# Patient Record
Sex: FEMALE | NOT HISPANIC OR LATINO | Employment: UNEMPLOYED | URBAN - METROPOLITAN AREA
[De-identification: names, ages, dates, MRNs, and addresses within clinical notes are randomized per-mention and may not be internally consistent; named-entity substitution may affect disease eponyms.]

---

## 2018-01-03 ENCOUNTER — OFFICE VISIT - RIVER FALLS (OUTPATIENT)
Dept: FAMILY MEDICINE | Facility: CLINIC | Age: 23
End: 2018-01-03

## 2018-01-03 ASSESSMENT — MIFFLIN-ST. JEOR: SCORE: 1040.73

## 2018-01-04 LAB
CREAT SERPL-MCNC: 0.7 MG/DL (ref 0.5–1.1)
GLUCOSE BLD-MCNC: 84 MG/DL (ref 65–99)

## 2021-06-09 ENCOUNTER — OFFICE VISIT - RIVER FALLS (OUTPATIENT)
Dept: FAMILY MEDICINE | Facility: CLINIC | Age: 26
End: 2021-06-09

## 2021-06-09 ASSESSMENT — MIFFLIN-ST. JEOR: SCORE: 1013.51

## 2021-06-15 ENCOUNTER — OFFICE VISIT - HEALTHEAST (OUTPATIENT)
Dept: BEHAVIORAL HEALTH | Facility: TELEHEALTH | Age: 26
End: 2021-06-15

## 2021-06-15 DIAGNOSIS — F43.10 PTSD (POST-TRAUMATIC STRESS DISORDER): ICD-10-CM

## 2021-06-16 ENCOUNTER — COMMUNICATION - RIVER FALLS (OUTPATIENT)
Dept: FAMILY MEDICINE | Facility: CLINIC | Age: 26
End: 2021-06-16

## 2021-06-16 LAB
1,25(OH)2D SERPL-MCNC: 76 PG/ML (ref 18–72)
A/G RATIO - HISTORICAL: 1.9 (ref 1–2.5)
ALBUMIN SERPL-MCNC: 4.9 GM/DL (ref 3.6–5.1)
ALP SERPL-CCNC: 51 UNIT/L (ref 31–125)
ALT SERPL W P-5'-P-CCNC: 8 UNIT/L (ref 6–29)
AST SERPL W P-5'-P-CCNC: 14 UNIT/L (ref 10–30)
BILIRUB DIRECT SERPL-MCNC: 0.2 MG/DL
BILIRUB INDIRECT SERPL-MCNC: 1.2 MG/DL (ref 0.2–1.2)
BILIRUB SERPL-MCNC: 1.4 MG/DL (ref 0.2–1.2)
BUN SERPL-MCNC: 15 MG/DL (ref 7–25)
BUN/CREAT RATIO - HISTORICAL: NORMAL (ref 6–22)
CALCIUM SERPL-MCNC: 9.8 MG/DL (ref 8.6–10.2)
CHLORIDE BLD-SCNC: 105 MMOL/L (ref 98–110)
CO2 SERPL-SCNC: 25 MMOL/L (ref 20–32)
CREAT SERPL-MCNC: 0.73 MG/DL (ref 0.5–1.1)
EGFRCR SERPLBLD CKD-EPI 2021: 115 ML/MIN/1.73M2
ERYTHROCYTE [DISTWIDTH] IN BLOOD BY AUTOMATED COUNT: 11.7 % (ref 11–15)
GLOBULIN: 2.6 (ref 1.9–3.7)
GLUCOSE BLD-MCNC: 88 MG/DL (ref 65–99)
HCT VFR BLD AUTO: 40.2 % (ref 35–45)
HGB BLD-MCNC: 13.4 GM/DL (ref 11.7–15.5)
LIPASE SERPL-CCNC: 17 UNIT/L (ref 7–60)
MCH RBC QN AUTO: 32.4 PG (ref 27–33)
MCHC RBC AUTO-ENTMCNC: 33.3 GM/DL (ref 32–36)
MCV RBC AUTO: 97.1 FL (ref 80–100)
PLATELET # BLD AUTO: 236 10*3/UL (ref 140–400)
PMV BLD: 10.8 FL (ref 7.5–12.5)
POTASSIUM BLD-SCNC: 3.9 MMOL/L (ref 3.5–5.3)
PROT SERPL-MCNC: 7.5 GM/DL (ref 6.1–8.1)
RBC # BLD AUTO: 4.14 10*6/UL (ref 3.8–5.1)
SODIUM SERPL-SCNC: 137 MMOL/L (ref 135–146)
TSH SERPL DL<=0.005 MIU/L-ACNC: 1.14 MIU/L
VIT B12 SERPL-MCNC: 658 PG/ML (ref 200–1100)
VITAMIN D2, 1,25 (OH)2 - QUEST: <8 PG/ML
VITAMIN D3, 1,25 (OH)2 - QUEST: 76 PG/ML
WBC # BLD AUTO: 5.1 10*3/UL (ref 3.8–10.8)
ZINC SERPL-MCNC: 85 MCG/DL (ref 60–130)

## 2021-06-17 ENCOUNTER — COMMUNICATION - RIVER FALLS (OUTPATIENT)
Dept: FAMILY MEDICINE | Facility: CLINIC | Age: 26
End: 2021-06-17

## 2021-06-17 ENCOUNTER — OFFICE VISIT - RIVER FALLS (OUTPATIENT)
Dept: FAMILY MEDICINE | Facility: CLINIC | Age: 26
End: 2021-06-17

## 2021-06-17 ASSESSMENT — MIFFLIN-ST. JEOR: SCORE: 1031.65

## 2021-06-26 NOTE — PROGRESS NOTES
[facility]: : Integrated Behavioral Health  06/15/2021      Behavioral Health Clinician Progress Note    Patient Name: Natalie Simmons           Service Type:  Individual      Service Location:   Phone call (patient / identified key support person reached)     Session Start Time: 2:00pm  Session End Time: 2:42pm      Session Length: 38 - 52     Attendees: Patient    Visit Activities (Refresh list every visit): NEW and [facility] Only    Diagnostic Assessment Date: Next Session  Treatment Plan Review Date: NA  See Flowsheets for today's PHQ-9 and ORQUIDEA-7 results  Previous PHQ-9: No flowsheet data found.  Previous ORQUIDEA-7: No flowsheet data found.    KELSEY LEVEL:  No flowsheet data found.    DATA  Extended Session (60+ minutes): No  Interactive Complexity: No  Crisis: No  [facility] Patient: No    Treatment Objective(s) Addressed in This Session:  Target Behavior(s): disease management/lifestyle changes related to mental health    Depressed Mood: Increase interest, engagement, and pleasure in doing things  Decrease frequency and intensity of feeling down, depressed, hopeless  Improve quantity and quality of night time sleep / decrease daytime naps  Feel less tired and more energy during the day   Improve diet, appetite, mindful eating, and / or meal planning  Identify negative self-talk and behaviors: challenge core beliefs, myths, and actions  Improve concentration, focus, and mindfulness in daily activities   Feel less fidgety, restless or slow in daily activities / interpersonal interactions  Anxiety: will experience a reduction in anxiety, will develop more effective coping skills to manage anxiety symptoms, will develop healthy cognitive patterns and beliefs and will increase ability to function adaptively  PTSD Symptom Management    Current Stressors / Issues:  [facility] introduced self and role to patient. Patient reported that she has a significant history of trying therapy, medication, and working  with psychologists. Patient reported that she has a significant history of trauma that she knows is impacting her. Patient reported that she currently is struggling with feeling scattered, lack of motivation, depressive symptoms, lashing out in current relationship, and anxious symptoms. Patient shared with [facility] about some of her previous trauma and the fear she has now. [facility] and patient spent session processing through her current symptoms and the way her trauma is impacting her. [facility] encouraged patient to utilize the strategies that have been helping her manage her symptoms when needed.    Progress on Treatment Objective(s) / Homework:  New Objective established this session - PREPARATION (Decided to change - considering how); Intervened by negotiating a change plan and determining options / strategies for behavior change, identifying triggers, exploring social supports, and working towards setting a date to begin behavior change    Motivational Interviewing    MI Intervention: Expressed Empathy/Understanding, Supported Autonomy, Collaboration, Evocation, Permission to raise concern or advise, Open-ended questions and Reflections: simple and complex     Change Talk Expressed by the Patient: Desire to change Ability to change Reasons to change Need to change    Provider Response to Change Talk: E - Evoked more info from patient about behavior change, A - Affirmed patient's thoughts, decisions, or attempts at behavior change, R - Reflected patient's change talk and S - Summarized patient's change talk statements    Also provided psychoeducation about behavioral health condition, symptoms, and treatment options    Care Plan review completed: Yes    Medication Review:  No changes to current psychiatric medication(s)    Medication Compliance:  Yes    Changes in Health Issues:   None reported    Chemical Use Review:   Substance Use: No substance use concerns reported / identified     Tobacco Use: No current tobacco use.       Assessment: Current Emotional / Mental Status (status of significant symptoms):  Risk status (Self / Other harm or suicidal ideation)  Patient denies a history of suicidal ideation, suicide attempts, self-injurious behavior, homicidal ideation, homicidal behavior and and other safety concerns  Patient denies current concerns for personal safety.  Patient denies current or recent suicidal ideation or behaviors.  Patient denies current or recent homicidal ideation or behaviors.  Patient denies current or recent self injurious behavior or ideation.  Patient denies other safety concerns.  A safety and risk management plan has not been developed at this time, however patient was encouraged to call [place] [id number] should there be a change in any of these risk factors.    Appearance:   Unable to gather due to phone visit   Eye Contact:   Unable to gather due to phone visit   Psychomotor Behavior: Unable to gather due to phone visit   Attitude:   Cooperative   Orientation:   All  Speech   Rate / Production: Normal    Volume:  Normal   Mood:    Normal  Affect:    Appropriate   Thought Content:  Clear   Thought Form:  Coherent  Logical   Insight:    Good     Diagnoses:  1. PTSD (post-traumatic stress disorder)        Collateral Reports Completed:  Routed note to PCP    Plan: (Homework, other):  Patient was given information about behavioral services and encouraged to schedule a follow up appointment with the clinic [facility] in 1 week.  She was also given information about mental health symptoms and treatment options .  CD Recommendations: No indications of CD issues. DA to be completed at next session. JULY Garza, [facility]      ______________________________________________________________________

## 2021-08-30 ENCOUNTER — OFFICE VISIT - RIVER FALLS (OUTPATIENT)
Dept: FAMILY MEDICINE | Facility: CLINIC | Age: 26
End: 2021-08-30

## 2021-08-30 ASSESSMENT — MIFFLIN-ST. JEOR: SCORE: 1026.21

## 2021-09-15 ENCOUNTER — COMMUNICATION - RIVER FALLS (OUTPATIENT)
Dept: FAMILY MEDICINE | Facility: CLINIC | Age: 26
End: 2021-09-15

## 2021-09-16 ENCOUNTER — COMMUNICATION - RIVER FALLS (OUTPATIENT)
Dept: FAMILY MEDICINE | Facility: CLINIC | Age: 26
End: 2021-09-16

## 2022-01-02 ENCOUNTER — OFFICE VISIT - RIVER FALLS (OUTPATIENT)
Dept: FAMILY MEDICINE | Facility: CLINIC | Age: 27
End: 2022-01-02

## 2022-01-07 ENCOUNTER — LAB REQUISITION (OUTPATIENT)
Dept: LAB | Facility: CLINIC | Age: 27
End: 2022-01-07
Payer: MEDICAID

## 2022-01-07 ENCOUNTER — AMBULATORY - RIVER FALLS (OUTPATIENT)
Dept: FAMILY MEDICINE | Facility: CLINIC | Age: 27
End: 2022-01-07

## 2022-01-07 ENCOUNTER — OFFICE VISIT - RIVER FALLS (OUTPATIENT)
Dept: FAMILY MEDICINE | Facility: CLINIC | Age: 27
End: 2022-01-07

## 2022-01-07 DIAGNOSIS — U07.1 COVID-19: ICD-10-CM

## 2022-01-07 PROCEDURE — U0003 INFECTIOUS AGENT DETECTION BY NUCLEIC ACID (DNA OR RNA); SEVERE ACUTE RESPIRATORY SYNDROME CORONAVIRUS 2 (SARS-COV-2) (CORONAVIRUS DISEASE [COVID-19]), AMPLIFIED PROBE TECHNIQUE, MAKING USE OF HIGH THROUGHPUT TECHNOLOGIES AS DESCRIBED BY CMS-2020-01-R: HCPCS | Mod: ORL | Performed by: FAMILY MEDICINE

## 2022-01-08 ENCOUNTER — COMMUNICATION - RIVER FALLS (OUTPATIENT)
Dept: FAMILY MEDICINE | Facility: CLINIC | Age: 27
End: 2022-01-08

## 2022-01-08 LAB — SARS-COV-2 RNA RESP QL NAA+PROBE: NEGATIVE

## 2022-01-10 LAB — SARS-COV-2 RNA RESP QL NAA+PROBE: NEGATIVE

## 2022-02-11 VITALS
BODY MASS INDEX: 20.06 KG/M2 | DIASTOLIC BLOOD PRESSURE: 58 MMHG | HEART RATE: 69 BPM | WEIGHT: 93 LBS | HEIGHT: 57 IN | SYSTOLIC BLOOD PRESSURE: 110 MMHG

## 2022-02-11 VITALS
TEMPERATURE: 98.5 F | BODY MASS INDEX: 19.37 KG/M2 | WEIGHT: 89.8 LBS | HEART RATE: 70 BPM | DIASTOLIC BLOOD PRESSURE: 58 MMHG | HEIGHT: 57 IN | SYSTOLIC BLOOD PRESSURE: 120 MMHG

## 2022-02-12 VITALS
HEART RATE: 98 BPM | SYSTOLIC BLOOD PRESSURE: 114 MMHG | BODY MASS INDEX: 18.77 KG/M2 | BODY MASS INDEX: 19.63 KG/M2 | OXYGEN SATURATION: 99 % | WEIGHT: 87 LBS | DIASTOLIC BLOOD PRESSURE: 70 MMHG | OXYGEN SATURATION: 99 % | WEIGHT: 91 LBS | HEIGHT: 57 IN | HEART RATE: 72 BPM | TEMPERATURE: 97.7 F | HEIGHT: 57 IN

## 2022-02-16 NOTE — PROGRESS NOTES
Chief Complaint    Discuss referral for OBGYN for pelvic floor myalgia, also c/o hemorrhoids x4-5 years has tried everything OTC with no relief.  History of Present Illness       Patient is here to discuss pelvic floor dysfunction.       She was diagnosed at the [facility] with a pelvic floor hypertonus.  She had some treatment and seemed to improve she does a few exercises at home now.  But in the last 2 months she thinks his back has had a lot more pain and discomfort.  She also has noticed Hetzler harder to pass stool and she has had some hemorrhoidal problems.       Couple months ago she was having a lot of stress but she feels like that is settled down significantly she has sort of move forward and feels like she is handling life much better  Review of Systems       No bowel or bladder wound drainage, no vaginal discharge, no abdominal pain, no weight change  Physical Exam   Vitals & Measurements    T: 98.5  F (Tympanic)  HR: 70 (Peripheral)  BP: 120/58     HT: 57 in  WT: 89.8 lb  BMI: 19.43        Alert talkative normal cognition very pleasant  Assessment/Plan       1. Hemorrhoids (K64.9)          I suspect this is related to her hypertonus she certainly needs to be on fiber present before she sees a surgeon to discuss surgical treatment she should try to have her hypertonus improved                2. Pelvic floor dysfunction (M62.89)          Have referred her to physical therapy certainly if she is not making progress we can have her see her gynecologist         Ordered:          Referral (Request), 08/30/21 12:30:00 CDT, Referred to: Physical Therapy, Reason for referral: pelvic floor dysfxn, Pelvic floor dysfunction           Patient Information     Name:TOYIN YOUNG      Address:      [address] [place] [id number]     Sex:Female     YOB: 1995     [phone]     Emergency Contact:[facility], CONTACT     MRN:[id number]     FIN:[id number]     Location:[facility]  [place]     Date of Service:08/30/2021      Primary Care Physician:       Gonzalo Ortiz MD, [phone]      Attending Physician:       Gonzalo Ortiz MD, [phone]  Problem List/Past Medical History    Ongoing     GERD (gastroesophageal reflux disease)     Tobacco user    Historical     No qualifying data  Procedure/Surgical History     Wide excision of skin lesion (04/18/2012)      Comments: Right elbow tumor - myoepithelioma..     Excision (04/05/2012)      Comments: Right elbow mass.     Myringotomy and insertion of tympanic ventilation tube (1998)      Comments: Bilateral.     tumor excision      Comments: had benign tumor removed from left elbow---precancerous..  Medications    famotidine 20 mg oral tablet, 20 mg= 1 tab(s)  Allergies    No Known Medication Allergies  Social History    Smoking Status     Current every day smoker     Alcohol      Current, 3-5 times per week     Electronic Cigarette/Vaping      Electronic Cigarette Use: Never.     Employment/School      Work/School description: .     Exercise      Exercise frequency: No regular exercise..     Home/Environment      Marital status: Single. Lives with Family. Safe place to go: Yes.     Nutrition/Health      Type of diet: Regular.     Sexual      Sexually active: Yes. Sexual orientation: Bisexual.     Substance Abuse      Past     Tobacco      10 or more cigarettes (1/2 pack or more)/day in last 30 days  Family History    ADHD - Attention deficit disorder with hyperactivity: Sister.    Alcohol abuse: Father.    Asthma: Mother, Father and Sister.    Depression: Mother.    Diabetes mellitus: Grandmother (P).    Hypertension: Grandmother (P).    Substance abuse: Father.  Lab Results          Lab Results (Last 4 results within 90 days)           Sodium Level: 137 mmol/L [135 mmol/L - 146 mmol/L] (06/09/21 11:26:00)          Potassium Level: 3.9 mmol/L [3.5 mmol/L - 5.3 mmol/L] (06/09/21 11:26:00)          Chloride Level:  105 mmol/L [98 mmol/L - 110 mmol/L] (06/09/21 11:26:00)          CO2 Level: 25 mmol/L [20 mmol/L - 32 mmol/L] (06/09/21 11:26:00)          Glucose Level: 88 mg/dL [65 mg/dL - 99 mg/dL] (06/09/21 11:26:00)          BUN: 15 mg/dL [7 mg/dL - 25 mg/dL] (06/09/21 11:26:00)          Creatinine Level: 0.73 mg/dL [0.5 mg/dL - 1.1 mg/dL] (06/09/21 11:26:00)          BUN/Creat Ratio: NOT APPLICABLE [6  - 22] (06/09/21 11:26:00)          eGFR: 115 mL/min/1.73m2 (06/09/21 11:26:00)          eGFR African American: 134 mL/min/1.73m2 (06/09/21 11:26:00)          Calcium Level: 9.8 mg/dL [8.6 mg/dL - 10.2 mg/dL] (06/09/21 11:26:00)          Bilirubin Total: 1.4 mg/dL High [0.2 mg/dL - 1.2 mg/dL] (06/09/21 11:26:00)          Bilirubin Direct: 0.2 mg/dL (06/09/21 11:26:00)          Bilirubin Indirect: 1.2 [0.2  - 1.2] (06/09/21 11:26:00)          Alkaline Phosphatase: 51 unit/L [31 unit/L - 125 unit/L] (06/09/21 11:26:00)          AST/SGOT: 14 unit/L [10 unit/L - 30 unit/L] (06/09/21 11:26:00)          ALT/SGPT: 8 unit/L [6 unit/L - 29 unit/L] (06/09/21 11:26:00)          Protein Total: 7.5 gm/dL [6.1 gm/dL - 8.1 gm/dL] (06/09/21 11:26:00)          Albumin Level: 4.9 gm/dL [3.6 gm/dL - 5.1 gm/dL] (06/09/21 11:26:00)          Globulin: 2.6 [1.9  - 3.7] (06/09/21 11:26:00)          A/G Ratio: 1.9 [1  - 2.5] (06/09/21 11:26:00)          Lipase Level: 17 unit/L [7 unit/L - 60 unit/L] (06/09/21 11:26:00)          Zinc Level: 85 mcg/dL [60 mcg/dL - 130 mcg/dL] (06/09/21 11:26:00)          Vitamin B12 Level: 658 pg/mL [200 pg/mL - 1100 pg/mL] (06/09/21 11:26:00)          Vitamin D, 1, 25 Dihydroxy Total: 76 pg/mL High [18 pg/mL - 72 pg/mL] (06/09/21 11:26:00)          Vitamin D2, 1, 25 Dihydroxy: <8 (06/09/21 11:26:00)          Vitamin D3, 1, 25 Dihydroxy: 76 pg/mL (06/09/21 11:26:00)          TSH: 1.14 mIU/L (06/09/21 11:26:00)          WBC: 5.1 [3.8  - 10.8] (06/09/21 11:26:00)          RBC: 4.14 [3.8  - 5.1] (06/09/21 11:26:00)           Hgb: 13.4 gm/dL [11.7 gm/dL - 15.5 gm/dL] (06/09/21 11:26:00)          Hct: 40.2 % [35 % - 45 %] (06/09/21 11:26:00)          MCV: 97.1 fL [80 fL - 100 fL] (06/09/21 11:26:00)          MCH: 32.4 pg [27 pg - 33 pg] (06/09/21 11:26:00)          MCHC: 33.3 gm/dL [32 gm/dL - 36 gm/dL] (06/09/21 11:26:00)          RDW: 11.7 % [11 % - 15 %] (06/09/21 11:26:00)          Platelet: 236 [140  - 400] (06/09/21 11:26:00)          MPV: 10.8 fL [7.5 fL - 12.5 fL] (06/09/21 11:26:00)  Immunizations          Scheduled Immunizations          Dose Date(s)          DTaP          01/14/1997, 03/28/1997, 05/21/1997, 10/28/1998, 07/12/2001          Hep A, pediatric/adolescent          08/18/2008, 08/26/2009          hepatitis B pediatric vaccine          10/23/1997, 01/14/1997, 1996          Hib (HbOC)          03/28/1997, 05/21/1997, 10/28/1998, 01/14/1997          human papillomavirus vaccine          12/13/2012          influenza virus vaccine, inactivated          02/21/2007, 09/30/2019          IPV          07/21/2001, Dose Not Given, Dose Not Given, Dose Not Given          meningococcal conjugate vaccine          08/26/2009          MMR (measles/mumps/rubella)          07/12/2007, 11/18/1997, 08/19/2019          pneumococcal (PCV7)          Dose Not Given, Dose Not Given, Dose Not Given, Dose Not Given          rabies vaccine, human diploid cell          08/09/2019          Td          08/26/2009          tetanus/diphth/pertuss (Tdap) adult/adol          06/17/2019          varicella          08/15/2007, 08/24/2000, 08/18/2008          Other Immunizations          OPV          01/14/1997, 03/28/1997, 05/21/1997

## 2022-02-16 NOTE — PROCEDURES
Accession Number:       [id number]  CLINICAL INFORMATION::     High risk HPV Hx/Rx  LMP::     NA  PREV. PAP::     NA  PREV. BX::     NA  SOURCE::     Cervix  STATEMENT OF ADEQUACY::     Satisfactory for evaluation. Endocervical/transformation zone component present. Age and/or menstrual status not provided  INTERPRETATION/RESULT::     Negative for intraepithelial lesion or malignancy.  COMMENT::     This Pap test has been evaluated with computer assisted technology.  CYTOTECHNOLOGIST::     LUCA FARMER(ASCP) CT Screening location: [place] [address]  REVIEW CYTOTECHNOLOGIST::     LUCA FLEMING(ASCP) CT Screening location: [place] [address]  CHLAMYDIA TRACHOMATIS RNA, TMA:     NOT DETECTED  NEISSERIA GONORRHOEAE RNA, TMA:     NOT DETECTED  COMMENT:     See comment       This test was performed using the APTIMA COMBO2 Assay       ([employer]         The analytical performance characteristics of this       assay, when used to test [employer] specimens have       been determined by [employer].
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
## 2022-02-16 NOTE — TELEPHONE ENCOUNTER
---------------------  From: Gonzalo Ortiz MD   To: TOYIN YOUNG    Sent: 6/17/2021 12:03:12 PM CDT  Subject: General Message     Your blood tests are in a good spot      Results:  Date Result Name Ind Value Ref Range   6/9/2021 11:26 AM Sodium Level  137 mmol/L (135 - 146)   6/9/2021 11:26 AM Potassium Level  3.9 mmol/L (3.5 - 5.3)   6/9/2021 11:26 AM Chloride Level  105 mmol/L (98 - 110)   6/9/2021 11:26 AM CO2 Level  25 mmol/L (20 - 32)   6/9/2021 11:26 AM Glucose Level  88 mg/dL (65 - 99)   6/9/2021 11:26 AM BUN  15 mg/dL (7 - 25)   6/9/2021 11:26 AM Creatinine Level  0.73 mg/dL (0.50 - 1.10)   6/9/2021 11:26 AM BUN/Creat Ratio  NOT APPLICABLE (6 - 22)   6/9/2021 11:26 AM eGFR  115 mL/min/1.73m2 (> OR = 60 - )   6/9/2021 11:26 AM eGFR African American  134 mL/min/1.73m2 (> OR = 60 - )   6/9/2021 11:26 AM Calcium Level  9.8 mg/dL (8.6 - 10.2)   6/9/2021 11:26 AM Bilirubin Total ((H)) 1.4 mg/dL (0.2 - 1.2)   6/9/2021 11:26 AM Bilirubin Direct  0.2 mg/dL ( - < OR = 0.2)   6/9/2021 11:26 AM Bilirubin Indirect  1.2 (0.2 - 1.2)   6/9/2021 11:26 AM Alkaline Phosphatase  51 unit/L (31 - 125)   6/9/2021 11:26 AM AST/SGOT  14 unit/L (10 - 30)   6/9/2021 11:26 AM ALT/SGPT  8 unit/L (6 - 29)   6/9/2021 11:26 AM Protein Total  7.5 gm/dL (6.1 - 8.1)   6/9/2021 11:26 AM Albumin Level  4.9 gm/dL (3.6 - 5.1)   6/9/2021 11:26 AM Globulin  2.6 (1.9 - 3.7)   6/9/2021 11:26 AM A/G Ratio  1.9 (1.0 - 2.5)   6/9/2021 11:26 AM Lipase Level  17 unit/L (7 - 60)   6/9/2021 11:26 AM Zinc Level  85 mcg/dL (60 - 130)   6/9/2021 11:26 AM Vitamin B12 Level  658 pg/mL (200 - 1,100)   6/9/2021 11:26 AM Vitamin D, 1, 25 Dihydroxy Total ((H)) 76 pg/mL (18 - 72)   6/9/2021 11:26 AM Vitamin D2, 1, 25 Dihydroxy  <8 pg/mL    6/9/2021 11:26 AM Vitamin D3, 1, 25 Dihydroxy  76 pg/mL    6/9/2021 11:26 AM TSH  1.14 mIU/L    6/9/2021 11:26 AM WBC  5.1 (3.8 - 10.8)   6/9/2021 11:26 AM RBC  4.14 (3.80 - 5.10)   6/9/2021 11:26 AM Hgb  13.4 gm/dL (11.7  - 15.5)   6/9/2021 11:26 AM Hct  40.2 % (35.0 - 45.0)   6/9/2021 11:26 AM MCV  97.1 fL (80.0 - 100.0)   6/9/2021 11:26 AM MCH  32.4 pg (27.0 - 33.0)   6/9/2021 11:26 AM MCHC  33.3 gm/dL (32.0 - 36.0)   6/9/2021 11:26 AM RDW  11.7 % (11.0 - 15.0)   6/9/2021 11:26 AM Platelet  236 (140 - 400)   6/9/2021 11:26 AM MPV  10.8 fL (7.5 - 12.5)

## 2022-02-16 NOTE — LETTER
(Inserted Image. Unable to display)   [address] [place] [address]  June 17, 2021      TOYIN YOUNG  [address] [place] [address]        Dear TOYIN,      Thank you for selecting [facility] for your healthcare needs.       I am glad to let you know that your MRI of the brain is normal.  There are no structural abnormalities          Please contact me or my assistant at [phone] if you have any questions or concerns.     Sincerely,        Gonzalo Ortiz MD

## 2022-02-16 NOTE — PROGRESS NOTES
Chief Complaint    Discuss depression medication- Stopped Prozac due to side effects. Rash on hands x1 month  History of Present Illness       Patient here to discuss her recent testing.  She had normal lab values and a normal MRI.  Most importantly to her she did talk to a counselor and feels like it will be very helpful.  She decided she would like to get by with just counseling and not continue on the Prozac.  She has thought that the Prozac made her sleepy and that she only took it for a couple of days.       she also notes long history of some pelvic pain.   She has been told she has pelvic floor dysfunction  Physical Exam       Alert oriented       Normal cognition       Appropriate emotions       Normal gait  Assessment/Plan       1. Depression (F32.9)          Reviewed her labs talked about treatment for depression notes many faceted.  Counseling was proven to be very effective and she should continue it.  She wishes to add a different medication we can discuss it at that time         also referred to PT for her pelvic problems  Patient Information     Name:TOYIN YOUNG      Address:      [address] [place] [phone]     Sex:Female     YOB: 1995     [phone]     Emergency Contact:[facility]GENC[facility], CONTACT     MRN:[id number]     FIN:[id number]     Location:[facility]     Date of Service:06/17/2021      Primary Care Physician:       Gonzalo Ortiz MD, [phone]      Attending Physician:       Gonzalo Ortiz MD, [phone]  Problem List/Past Medical History    Ongoing     GERD (gastroesophageal reflux disease)     Tobacco user    Historical     No qualifying data  Procedure/Surgical History     Wide excision of skin lesion (04/18/2012)      Comments: Right elbow tumor - myoepithelioma..     Excision (04/05/2012)      Comments: Right elbow mass.     Myringotomy and insertion of tympanic ventilation tube (1998)      Comments: Bilateral.     tumor  excision      Comments: had benign tumor removed from left elbow---precancerous..  Medications    famotidine 20 mg oral tablet, 20 mg= 1 tab(s)    FLUoxetine 10 mg oral tablet, 10 mg= 1 tab(s), Oral, daily, 1 refills  Allergies    No Known Medication Allergies  Social History    Smoking Status     Current every day smoker     Alcohol      Current, 3-5 times per week     Electronic Cigarette/Vaping      Electronic Cigarette Use: Never.     Employment/School      Work/School description: .     Exercise      Exercise frequency: No regular exercise..     Home/Environment      Marital status: Single. Lives with Family. Safe place to go: Yes.     Nutrition/Health      Type of diet: Regular.     Sexual      Sexually active: Yes. Sexual orientation: Bisexual.     Substance Abuse      Past     Tobacco      10 or more cigarettes (1/2 pack or more)/day in last 30 days  Family History    ADHD - Attention deficit disorder with hyperactivity: Sister.    Alcohol abuse: Father.    Asthma: Mother, Father and Sister.    Depression: Mother.    Diabetes mellitus: Grandmother (P).    Hypertension: Grandmother (P).    Substance abuse: Father.  Lab Results          Lab Results (Last 4 results within 90 days)          Sodium Level: 137 mmol/L [135 mmol/L - 146 mmol/L] (06/09/21 11:26:00)          Potassium Level: 3.9 mmol/L [3.5 mmol/L - 5.3 mmol/L] (06/09/21 11:26:00)          Chloride Level: 105 mmol/L [98 mmol/L - 110 mmol/L] (06/09/21 11:26:00)          CO2 Level: 25 mmol/L [20 mmol/L - 32 mmol/L] (06/09/21 11:26:00)          Glucose Level: 88 mg/dL [65 mg/dL - 99 mg/dL] (06/09/21 11:26:00)          BUN: 15 mg/dL [7 mg/dL - 25 mg/dL] (06/09/21 11:26:00)          Creatinine Level: 0.73 mg/dL [0.5 mg/dL - 1.1 mg/dL] (06/09/21 11:26:00)          BUN/Creat Ratio: NOT APPLICABLE [6  - 22] (06/09/21 11:26:00)          eGFR: 115 mL/min/1.73m2 (06/09/21 11:26:00)          eGFR African American: 134 mL/min/1.73m2 (06/09/21  11:26:00)          Calcium Level: 9.8 mg/dL [8.6 mg/dL - 10.2 mg/dL] (06/09/21 11:26:00)          Bilirubin Total: 1.4 mg/dL High [0.2 mg/dL - 1.2 mg/dL] (06/09/21 11:26:00)          Bilirubin Direct: 0.2 mg/dL (06/09/21 11:26:00)          Bilirubin Indirect: 1.2 [0.2  - 1.2] (06/09/21 11:26:00)          Alkaline Phosphatase: 51 unit/L [31 unit/L - 125 unit/L] (06/09/21 11:26:00)          AST/SGOT: 14 unit/L [10 unit/L - 30 unit/L] (06/09/21 11:26:00)          ALT/SGPT: 8 unit/L [6 unit/L - 29 unit/L] (06/09/21 11:26:00)          Protein Total: 7.5 gm/dL [6.1 gm/dL - 8.1 gm/dL] (06/09/21 11:26:00)          Albumin Level: 4.9 gm/dL [3.6 gm/dL - 5.1 gm/dL] (06/09/21 11:26:00)          Globulin: 2.6 [1.9  - 3.7] (06/09/21 11:26:00)          A/G Ratio: 1.9 [1  - 2.5] (06/09/21 11:26:00)          Lipase Level: 17 unit/L [7 unit/L - 60 unit/L] (06/09/21 11:26:00)          Zinc Level: 85 mcg/dL [60 mcg/dL - 130 mcg/dL] (06/09/21 11:26:00)          Vitamin B12 Level: 658 pg/mL [200 pg/mL - 1100 pg/mL] (06/09/21 11:26:00)          Vitamin D, 1, 25 Dihydroxy Total: 76 pg/mL High [18 pg/mL - 72 pg/mL] (06/09/21 11:26:00)          Vitamin D2, 1, 25 Dihydroxy: <8 (06/09/21 11:26:00)          Vitamin D3, 1, 25 Dihydroxy: 76 pg/mL (06/09/21 11:26:00)          TSH: 1.14 mIU/L (06/09/21 11:26:00)          WBC: 5.1 [3.8  - 10.8] (06/09/21 11:26:00)          RBC: 4.14 [3.8  - 5.1] (06/09/21 11:26:00)          Hgb: 13.4 gm/dL [11.7 gm/dL - 15.5 gm/dL] (06/09/21 11:26:00)          Hct: 40.2 % [35 % - 45 %] (06/09/21 11:26:00)          MCV: 97.1 fL [80 fL - 100 fL] (06/09/21 11:26:00)          MCH: 32.4 pg [27 pg - 33 pg] (06/09/21 11:26:00)          MCHC: 33.3 gm/dL [32 gm/dL - 36 gm/dL] (06/09/21 11:26:00)          RDW: 11.7 % [11 % - 15 %] (06/09/21 11:26:00)          Platelet: 236 [140  - 400] (06/09/21 11:26:00)          MPV: 10.8 fL [7.5 fL - 12.5 fL] (06/09/21 11:26:00)  Immunizations          Scheduled Immunizations          Dose  Date(s)          DTaP          01/14/1997, 03/28/1997, 05/21/1997, 10/28/1998, 07/12/2001          Hep A, pediatric/adolescent          08/18/2008, 08/26/2009          hepatitis B pediatric vaccine          10/23/1997, 01/14/1997, 1996          Hib (HbOC)          03/28/1997, 05/21/1997, 10/28/1998, 01/14/1997          human papillomavirus vaccine          12/13/2012          influenza virus vaccine, inactivated          02/21/2007, 09/30/2019          IPV          07/21/2001, Dose Not Given, Dose Not Given, Dose Not Given          meningococcal conjugate vaccine          08/26/2009          MMR (measles/mumps/rubella)          07/12/2007, 11/18/1997, 08/19/2019          pneumococcal (PCV7)          Dose Not Given, Dose Not Given, Dose Not Given, Dose Not Given          rabies vaccine, human diploid cell          08/09/2019          Td          08/26/2009          tetanus/diphth/pertuss (Tdap) adult/adol          06/17/2019          varicella          08/15/2007, 08/24/2000, 08/18/2008          Other Immunizations          OPV          01/14/1997, 03/28/1997, 05/21/1997

## 2022-02-16 NOTE — NURSING NOTE
Comprehensive Intake Entered On:  8/30/2021 12:12 PM CDT    Performed On:  8/30/2021 12:00 PM CDT by Christiane Chicas               Summary   Chief Complaint :   Discuss referral for OBGYN for pelvic floor myalgia, also c/o hemorrhoids x4-5 years has tried everything OTC with no relief.    Menstrual Status :   Menarcheal   Weight Measured :   89.8 lb(Converted to: 89 lb 13 oz, 40.733 kg)    Height Measured :   57 in(Converted to: 4 ft 9 in, 144.78 cm)    Body Mass Index :   19.43 kg/m2   Body Surface Area :   1.28 m2   Systolic Blood Pressure :   120 mmHg   Diastolic Blood Pressure :   58 mmHg (LOW)    Mean Arterial Pressure :   79 mmHg   Peripheral Pulse Rate :   70 bpm   BP Site :   Right arm   Pulse Site :   Radial artery   BP Method :   Manual   HR Method :   Manual   Temperature Tympanic :   98.5 DegF(Converted to: 36.9 DegC)    Christiane Chicas - 8/30/2021 12:00 PM CDT   Health Status   Allergies Verified? :   Yes   Medication History Verified? :   Yes   Medical History Verified? :   Yes   Pre-Visit Planning Status :   Completed   Tobacco Use? :   Current every day smoker   Tobacco Cessation Review :   Not ready to quit   Christiane Chicas - 8/30/2021 12:00 PM CDT   Consents   Consent for Immunization Exchange :   Consent Granted   Consent for Immunizations to Providers :   Consent Granted   Christiane Chicas - 8/30/2021 12:00 PM CDT   Meds / Allergies   (As Of: 8/30/2021 12:12:47 PM CDT)   Allergies (Active)   No Known Medication Allergies  Estimated Onset Date:   Unspecified ; Created By:   Laura Yi MA; Reaction Status:   Active ; Category:   Drug ; Substance:   No Known Medication Allergies ; Type:   Allergy ; Updated By:   Laura Yi MA; Reviewed Date:   8/30/2021 12:11 PM CDT        Medication List   (As Of: 8/30/2021 12:12:47 PM CDT)   Prescription/Discharge Order    FLUoxetine  :   FLUoxetine ; Status:   Processing ; Ordered As Mnemonic:   FLUoxetine 10 mg oral tablet ; Ordering Provider:    Gonzalo Ortiz MD; Action Display:   Complete ; Catalog Code:   FLUoxetine ; Order Dt/Tm:   8/30/2021 12:12:18 PM CDT            Home Meds    famotidine  :   famotidine ; Status:   Documented ; Ordered As Mnemonic:   famotidine 20 mg oral tablet ; Simple Display Line:   20 mg, 1 tab(s), 0 Refill(s) ; Catalog Code:   famotidine ; Order Dt/Tm:   6/9/2021 10:38:39 AM CDT            Social History   Social History   (As Of: 8/30/2021 12:12:47 PM CDT)   Alcohol:        Current, 3-5 times per week   (Last Updated: 1/4/2018 3:51:31 PM CST by Guillermina Galdamez)          Tobacco:        Smoker, current status unknown, Cigarettes   (Last Updated: 1/4/2018 3:51:42 PM CST by Guillermina Galdamez)   10 or more cigarettes (1/2 pack or more)/day in last 30 days   (Last Updated: 6/9/2021 10:37:26 AM CDT by Jennifer Pool CMA)          Electronic Cigarette/Vaping:        Electronic Cigarette Use: Never.   (Last Updated: 6/9/2021 10:37:46 AM CDT by Jennifer Pool CMA)          Substance Abuse:        Past   (Last Updated: 1/4/2018 3:51:48 PM CST by Guillermina Galdamez)          Employment/School:        Work/School description: .   (Last Updated: 8/14/2015 4:18:53 PM CDT by Luis Eduardo Rivero CMA)          Home/Environment:        Marital status: Single.  Lives with Family.  Safe place to go: Yes.   (Last Updated: 1/4/2018 3:51:22 PM CST by Guillermina Galdamez)          Nutrition/Health:        Type of diet: Regular.   (Last Updated: 1/4/2018 3:51:54 PM CST by Guillermina Galdamez)          Exercise:        Exercise frequency: No regular exercise..   (Last Updated: 1/24/2014 1:26:30 PM CST by Laura Uribe)          Sexual:        Sexually active: Yes.  Sexual orientation: Bisexual.   (Last Updated: 1/4/2018 3:52:05 PM CST by Guillermina Galdamez)

## 2022-02-16 NOTE — TELEPHONE ENCOUNTER
---------------------  From: Dannielle Damian LPN (Phone Messages Pool ([id number]_[facility]))   To: [facility] Message Pool ([id number]_[place] [facility] [place]);     Sent: 9/15/2021 5:30:04 PM CDT  Subject: General Message       Phone Message Template      PCP:   SOUMYA      Time of Call:  1720       Person Calling:  pt  Phone number:  [phone]    Returned call at: _    Note:   Patient calling asking for medication refill of Famotidine 20mg, medication is only documented in chart. Uses [facility].    Please advise on refill. patient would like to be contact when prescription sent in .     Last office visit and reason:  08/30/21, hypertonus---------------------  From: Jennifer Pool CMA ([facility] Message Pool ([id number]_[facility]))   To: Gonzalo Ortiz MD;     Sent: 9/16/2021 7:57:19 AM CDT  Subject: FW: General Message---------------------  From: Gonzalo Ortiz MD   To: [facility] Message Pool ([id number]_[place] [facility] [place]);     Sent: 9/16/2021 2:21:55 PM CDT  Subject: RE: General Message     ok for 90 days and refill 1Rx sent and dvm left notifying patient

## 2022-02-16 NOTE — TELEPHONE ENCOUNTER
Order is faxed to [facility] and they will contact patient to schedule.  Patient informed.

## 2022-02-16 NOTE — TELEPHONE ENCOUNTER
---------------------  From: Luis Eduardo Rivero CMA (Phone Messages Pool ([id number]_[facility]))   Sent: 9/16/2021 2:58:10 PM CDT  Subject: General Message     Phone Message    PCP:   SOUMYA      Time of Call:  1449       Person Calling:  self  Phone number:  [phone]    Reason for call:  Rx refill  Returned call at: _    Note:   Pt callsin wanting to know if SOUMYA filled her famotidine.  I informed pt the refill for famotidine was sent to her pharmacy by SOUMYA today for #90 and 1 refill.  Pt voiced understanding of this message.  Luis Eduardo Rivero CMA    Last office visit and reason:  8/30/21 hypertonus    Transferred to: _

## 2022-02-16 NOTE — PROGRESS NOTES
Patient:   TOYIN YOUNG            MRN: [id number]            FIN: [id number]               Age:   22 years     Sex:  Female     :  1995   Associated Diagnoses:   Encounter for gynecological examination (general) (routine) without abnormal findings   Author:   Bakari Taylor MD      Visit Information      Date of Service: 2018 03:00 pm  Performing Location: [facility]  Encounter#: [id number]      Primary Care Provider (PCP):  NONE ,       Referring Provider:  Bakari Taylor MD    NPI# [id number]      Chief Complaint   1/3/2018 3:19 PM CST     Patient is here for annual exam.      Interval History   The patient is a 22-year-old, nulligravid female in today for annual visit.  She has had unprotected sex and would like to have STD testing.  She has had Paps in the past but is now due for one.  She has a history of eating disorder and was bulimic and anorexic at times in her past.  She weighed approximately 120 pounds when she was age 17, went down to 85 pounds at one point after she states a traumatic relationship, and now has stabilized at about 93 pounds and wonders what she might due to put on weight.  She does not have any good eating habits and may go two days without eating.  She works for a [employer] company and eats pizza periodically but does not seem to have any regular foods that she likes or eats.  She has had a discussion about nutrition but never had therapy for her eating disorders.  She does not want any further discussion about healthy eating or any advice regarding that at this time.  She has a history of pelvic floor hypertonus and did have therapy at [facility] regarding that because of pelvic pain, and this has been stable doing her exercises that she was given.  She has had some muscle cramping up in her left shoulder just recently and wonders if it is somehow related.  She does not have any pain or vomiting with eating but just simple doesn t have much appetite and has  dislikes for a lot of different foods.  Family history is limited to her mother who has pancreatitis and a sister who had an ovarian cyst.  Her father is not known to her.  Review of systems is otherwise negative.      Review of Systems   Review  of systems is negative except as documented under interval history.      Health Status   Allergies:    Allergic Reactions (Selected)  No Known Medication Allergies   Medications:  (Selected)      Problem list:    All Problems  Tobacco user / SNOMED CT 035754442 / Probable  GERD (gastroesophageal reflux disease) / SNOMED CT 027207613 / Confirmed      Histories   Past Medical History:    Active  GERD (gastroesophageal reflux disease) (702745569)   Family History:    Diabetes mellitus  Grandmother (P)  Asthma  Mother (Lacey)  Father (Mega Simmons)  Sister  Hypertension  Grandmother (P)  Substance abuse  Father (Mega Simmons)  Depression  Mother (Lacey)  ADHD - Attention deficit disorder with hyperactivity  Sister     Procedure history:    Wide excision of skin lesion ([id number]) on 4/18/2012 at 16 Years.  Comments:  10/7/2014 4:42 PM - Larissa Contreras  Right elbow tumor - myoepithelioma.  Excision ([id number]) on 4/5/2012 at 16 Years.  Comments:  10/7/2014 4:42 PM - Larissa Contreras  Right elbow mass  Myringotomy and insertion of tympanic ventilation tube ([id number]) in 1998 at 3 Years.  Comments:  10/7/2014 4:36 PM - Larissa Contreras  Bilateral  tumor excision.  Comments:  1/22/2014 1:42 PM - Kd DAVIDSON, Laura  had benign tumor removed from left elbow---precancerous.   Social History:        Alcohol Assessment            Current, 3-5 times per week      Tobacco Assessment            Smoker, current status unknown, Cigarettes      Substance Abuse Assessment            Past      Employment and Education Assessment            Work/School description: .      Home and Environment Assessment            Marital status: Single.  Lives with Family.  Safe place to go:  Yes.      Nutrition and Health Assessment            Type of diet: Regular.      Exercise and Physical Activity Assessment            Exercise frequency: No regular exercise..      Sexual Assessment            Sexually active: Yes.  Sexual orientation: Bisexual.        Physical Examination   Vital Signs   1/3/2018 3:19 PM CST Peripheral Pulse Rate 69 bpm    HR Method Electronic    Systolic Blood Pressure 110 mmHg    Diastolic Blood Pressure 58 mmHg  LOW    Mean Arterial Pressure 75 mmHg    BP Site Right arm    BP Method Electronic      General:  Alert and oriented, No acute distress.    HENT:  Within normal limits..    Respiratory:  Lungs are clear to auscultation.    Cardiovascular:  Normal rate, Regular rhythm, No murmur.    Gastrointestinal:  Soft, Non-tender, Non-distended.    Gynecologic:  Pelvic examination finds normal external genitalia.  There is some increased tone with pelvic floor but she is able to do Kegel and relax her pelvic floor.  Doing the bimanual examination was uncomfortable for her but she tolerated it fairly normally and states that it is similar to her previous examinations.  There were no masses and there did not seem to be any specific areas of tenderness, mostly just splinting to avoid examination.  There is no unusual discharge.  Pap smear was taken and submitted also for Chlamydia and GC testing.  .    Integumentary:  Warm, Dry, No rash.    Neurologic:  Alert, Oriented, Normal sensory, Normal motor function.    Psychiatric:  Cooperative, Appropriate mood & affect, Normal judgment.       Impression and Plan   Diagnosis     Encounter for gynecological examination (general) (routine) without abnormal findings (VUM98-SS Z01.419).     Eating disorder, but stable weight.  Potential for electrolyte abnormalities.  No desire for birth control or hearing further about avoiding sexually transmitted diseases..     Plan:  The patient was given some instruction regarding these various issues with  our discussion.  She does not want any further followup regarding eating or birth control issues.  She will be informed of the results of testing which will include an electrolyte battery.  She will return p.r.n..    Patient Instructions:       Counseled: Patient, Regarding diagnosis, Regarding treatment, Verbalized understanding.

## 2022-02-16 NOTE — NURSING NOTE
Comprehensive Intake Entered On:  2021 12:19 PM CDT    Performed On:  2021 12:16 PM CDT by Janie Goss CMA               Summary   Chief Complaint :   Discuss depression medication- Stopped Prozac due to side effects. Rash on hands x1 month    Menstrual Status :   Menarcheal   Weight Measured :   91 lb(Converted to: 91 lb 0 oz, 41.277 kg)    Height Measured :   57 in(Converted to: 4 ft 9 in, 144.78 cm)    Body Mass Index :   19.69 kg/m2   Body Surface Area :   1.29 m2   Systolic Blood Pressure :   104 mmHg   Peripheral Pulse Rate :   98 bpm   BP Site :   Right arm   Pulse Site :   Radial artery   BP Method :   Electronic   HR Method :   Electronic   Oxygen Saturation :   99 %   Janie Goss CMA - 2021 12:16 PM CDT   Health Status   Allergies Verified? :   Yes   Medication History Verified? :   Yes   Medical History Verified? :   Yes   Pre-Visit Planning Status :   Completed   Janie Goss CMA - 2021 12:16 PM CDT   Demographics   Last Name :   T[place]   Address :   [address]   First Name :   Natalie Canchola Initial :   A   Responsible Party Date of Birth () :   1995 CST   City :   [place]   State :   [place]   Zip Code :   [address]   Janie Goss CMA - 2021 12:16 PM CDT   Consents   Consent for Immunization Exchange :   Consent Granted   Consent for Immunizations to Providers :   Consent Granted   Janie Goss CMA - 2021 12:16 PM CDT   Problems   (As Of: 2021 12:19:49 PM CDT)   Problems(Active)    GERD (gastroesophageal reflux disease) (SNOMED CT  :145848244 )  Name of Problem:   GERD (gastroesophageal reflux disease) ; Recorder:   Laura Uribe; Confirmation:   Confirmed ; Classification:   Medical ; Code:   [id number] ; Contributor System:   [employer] ; Last Updated:   2014 1:24 PM CST ; Life Cycle Date:   2014 ; Life Cycle Status:   Active ; Vocabulary:   SNOMED CT        Tobacco user (SNOMED CT  :282727175 )  Name of Problem:   Tobacco user ;  Recorder:   SYSTEM; Confirmation:   Probable ; Classification:   Medical ; Code:   058901721 ; Last Updated:   2/28/2014 10:55 PM CST ; Life Cycle Date:   1/22/2014 ; Life Cycle Status:   Active ; Vocabulary:   SNOMED CT          ID Risk Screen   Recent Travel History :   No recent travel   Family Member Travel History :   No recent travel   Other Exposure to Infectious Disease :   Unknown   COVID-19 Testing Status :   No positive COVID-19 test   AndJanie sexton CMA - 6/17/2021 12:16 PM CDT   Social History   Social History   (As Of: 6/17/2021 12:19:49 PM CDT)   Alcohol:        Current, 3-5 times per week   (Last Updated: 1/4/2018 3:51:31 PM CST by Guillermina Galdamez)          Tobacco:        Smoker, current status unknown, Cigarettes   (Last Updated: 1/4/2018 3:51:42 PM CST by Guillermina Galdamez)   10 or more cigarettes (1/2 pack or more)/day in last 30 days   (Last Updated: 6/9/2021 10:37:26 AM CDT by Jennifer Pool CMA)          Electronic Cigarette/Vaping:        Electronic Cigarette Use: Never.   (Last Updated: 6/9/2021 10:37:46 AM CDT by Jennifer Pool CMA)          Substance Abuse:        Past   (Last Updated: 1/4/2018 3:51:48 PM CST by Guillermina Galdamez)          Employment/School:        Work/School description: .   (Last Updated: 8/14/2015 4:18:53 PM CDT by Luis Eduardo Rivero CMA)          Home/Environment:        Marital status: Single.  Lives with Family.  Safe place to go: Yes.   (Last Updated: 1/4/2018 3:51:22 PM CST by Guillermina Galdamez)          Nutrition/Health:        Type of diet: Regular.   (Last Updated: 1/4/2018 3:51:54 PM CST by Guillermina Galdamez)          Exercise:        Exercise frequency: No regular exercise..   (Last Updated: 1/24/2014 1:26:30 PM CST by Laura Uribe)          Sexual:        Sexually active: Yes.  Sexual orientation: Bisexual.   (Last Updated: 1/4/2018 3:52:05 PM CST by Guillermina Galdamez)

## 2022-02-16 NOTE — TELEPHONE ENCOUNTER
---------------------  From: Ariana Jose CMA (Phone Messages Pool ([id number]_[facility]))   To: [facility] Message Pool ([id number]_[place]);     Sent: 6/16/2021 4:29:44 PM CDT  Subject: General Message     Phone Message    Note:   Pt called to say that she stopped taking the prozac 3 days after being prescribed it due to falling asleep during the day.  She has kept her therapy appt and feels much better with therapy so she doesn't feel that an anti depressant needs to be prescribed and she is going to cancel her upcoming appt for f/u          Person Calling:  Natalie  Phone number:  ---------------------  From: Dannielle Damian LPN ([facility] Message Pool ([id number]_[facility]))   To: Gonzalo Ortiz MD;     Sent: 6/16/2021 5:05:58 PM CDT  Subject: FW: General Message

## 2022-02-16 NOTE — LETTER
(Inserted Image. Unable to display)     January 04, 2019      TOYIN HANNAH   [place], [place] [id number]          Dear TOYIN,      Thank you for selecting [facility] (previously [facility]) for your healthcare needs.      Our records indicate you are due for the following services:     Annual Physical and Gynecologic Exam ~ Yearly wellness exams are important for your ongoing health and wellness.  This exam gives you the opportunity to meet with your health care provider to review your health, update immunizations and to recommend preventive screenings that you may be due for.  At your wellness visit, your Healthcare Provider will determine the need for a gynecologic exam and/or Pap smear.      To schedule an appointment or if you have further questions, please contact your primary clinic:   [facility]       [phone]   [facility]       [phone]              [facility]     [phone]      Powered by [employer]    Sincerely,    Bakari Taylor MD, OB/GYN

## 2022-02-16 NOTE — NURSING NOTE
Comprehensive Intake Entered On:  6/9/2021 10:43 AM CDT    Performed On:  6/9/2021 10:33 AM CDT by Jennifer Pool CMA   Chief Complaint :   Annual exam. Discuss stuttering with speech, twitching and also concerned about food intake.    Last Menstrual Period :   5/4/2021 CDT   Menstrual Status :   Menarcheal   Weight Measured :   87 lb(Converted to: 87 lb 0 oz, 39.463 kg)    Height Measured :   57 in(Converted to: 4 ft 9 in, 144.78 cm)    Body Mass Index :   18.82 kg/m2   Body Surface Area :   1.26 m2   Systolic Blood Pressure :   114 mmHg   Diastolic Blood Pressure :   70 mmHg   Mean Arterial Pressure :   85 mmHg   Peripheral Pulse Rate :   72 bpm   BP Site :   Right arm   BP Method :   Manual   Temperature Tympanic :   97.7 DegF(Converted to: 36.5 DegC)  (LOW)    Oxygen Saturation :   99 %   Jennifer Pool CMA - 6/9/2021 10:33 AM CDT   Health Status   Allergies Verified? :   Yes   Medication History Verified? :   Yes   Medical History Verified? :   Yes   Pre-Visit Planning Status :   Completed   Tobacco Use? :   Current every day smoker   Jennifer Pool CMA - 6/9/2021 10:33 AM CDT   Consents   Consent for Immunization Exchange :   Consent Granted   Consent for Immunizations to Providers :   Consent Granted   Jennifer Pool CMA - 6/9/2021 10:33 AM CDT   Meds / Allergies   (As Of: 6/9/2021 10:43:08 AM CDT)   Allergies (Active)   No Known Medication Allergies  Estimated Onset Date:   Unspecified ; Created By:   Laura Yi MA; Reaction Status:   Active ; Category:   Drug ; Substance:   No Known Medication Allergies ; Type:   Allergy ; Updated By:   Laura Yi MA; Reviewed Date:   6/9/2021 10:38 AM CDT        Medication List   (As Of: 6/9/2021 10:43:08 AM CDT)   Home Meds    famotidine  :   famotidine ; Status:   Documented ; Ordered As Mnemonic:   famotidine 20 mg oral tablet ; Simple Display Line:   20 mg, 1 tab(s), 0 Refill(s) ; Catalog Code:   famotidine ; Order Dt/Tm:   6/9/2021  10:38:39 AM CDT            ID Risk Screen   Recent Travel History :   No recent travel   Family Member Travel History :   No recent travel   Other Exposure to Infectious Disease :   Unknown   COVID-19 Testing Status :   No positive COVID-19 test   Jennifer Pool CMA - 6/9/2021 10:33 AM CDT   Social History   Social History   (As Of: 6/9/2021 10:43:08 AM CDT)   Alcohol:        Current, 3-5 times per week   (Last Updated: 1/4/2018 3:51:31 PM CST by Guillermina Galdamez)          Tobacco:        Smoker, current status unknown, Cigarettes   (Last Updated: 1/4/2018 3:51:42 PM CST by Guillermina Galdamez)   10 or more cigarettes (1/2 pack or more)/day in last 30 days   (Last Updated: 6/9/2021 10:37:26 AM CDT by Jennifer Pool CMA)          Electronic Cigarette/Vaping:        Electronic Cigarette Use: Never.   (Last Updated: 6/9/2021 10:37:46 AM CDT by Jennifer Pool CMA)          Substance Abuse:        Past   (Last Updated: 1/4/2018 3:51:48 PM CST by Guillermina Galdamez)          Employment/School:        Work/School description: .   (Last Updated: 8/14/2015 4:18:53 PM CDT by Luis Eduardo Rivero CMA)          Home/Environment:        Marital status: Single.  Lives with Family.  Safe place to go: Yes.   (Last Updated: 1/4/2018 3:51:22 PM CST by Guillermina Galdamez)          Nutrition/Health:        Type of diet: Regular.   (Last Updated: 1/4/2018 3:51:54 PM CST by Guillermina Galdamez)          Exercise:        Exercise frequency: No regular exercise..   (Last Updated: 1/24/2014 1:26:30 PM CST by Laura Uribe)          Sexual:        Sexually active: Yes.  Sexual orientation: Bisexual.   (Last Updated: 1/4/2018 3:52:05 PM CST by Guillermina Galdamez)

## 2022-02-16 NOTE — NURSING NOTE
CAGE Assessment Entered On:  6/9/2021 11:32 AM CDT    Performed On:  6/9/2021 11:32 AM CDT by Jennifer Pool CMA               Assessment   Have you ever felt you should cut down on your drinking :   No   Have people annoyed you by criticizing your drinking :   No   Have you ever felt bad or guilty about your drinking :   No   Have you ever taken a drink first thing in the morning to steady your nerves or get rid of a hangover (Eye-opener) :   No   CAGE Score :   0    Jennifer Pool CMA - 6/9/2021 11:32 AM CDT

## 2022-02-16 NOTE — TELEPHONE ENCOUNTER
---------------------  From: Jennifer Pool CMA   To: Referral Coordinators Pool ([id number]_[facility]);     Sent: 6/9/2021 11:30:39 AM CDT  Subject: MRI/Psychology     Please assist in scheduling MRI and Psychology referral per Braydon. Thanks

## 2022-02-16 NOTE — NURSING NOTE
Depression Screening Entered On:  6/9/2021 11:32 AM CDT    Performed On:  6/9/2021 11:32 AM CDT by Jennifer Pool CMA               Depression Screening   Little Interest - Pleasure in Activities :   More than half the days   Feeling Down, Depressed, Hopeless :   More than half the days   Initial Depression Screen Score :   4 Score   Poor Appetite or Overeating :   Nearly every day   Trouble Falling or Staying Asleep :   Nearly every day   Feeling Tired or Little Energy :   More than half the days   Feeling Bad About Yourself :   More than half the days   Trouble Concentrating :   Several days   Moving or Speaking Slowly :   More than half the days   Thoughts Better Off Dead or Hurting Self :   Not at all   Difficulty at Work, Home, Getting Along :   Extremely difficult   Detailed Depression Screen Score :   13    Total Depression Screen Score :   17    Jennifer Pool CMA - 6/9/2021 11:32 AM CDT

## 2022-03-02 VITALS
HEIGHT: 57 IN | HEIGHT: 57 IN | WEIGHT: 91.5 LBS | HEART RATE: 68 BPM | BODY MASS INDEX: 19.74 KG/M2 | SYSTOLIC BLOOD PRESSURE: 134 MMHG | DIASTOLIC BLOOD PRESSURE: 86 MMHG | BODY MASS INDEX: 19.8 KG/M2

## 2022-03-02 NOTE — NURSING NOTE
Comprehensive Intake Entered On:  1/2/2022 11:50 AM CST    Performed On:  1/2/2022 11:46 AM CST by Jennifer Pool CMA               Summary   Chief Complaint :   Right hand/pinky pain due to a fall. Swelling and bruising.   Menstrual Status :   Menarcheal   Weight Measured :   91.5 lb(Converted to: 91 lb 8 oz, 41.504 kg)    Height Measured :   57 in(Converted to: 4 ft 9 in, 144.78 cm)    Body Mass Index :   19.8 kg/m2   Body Surface Area :   1.29 m2   Systolic Blood Pressure :   134 mmHg (HI)    Diastolic Blood Pressure :   86 mmHg (HI)    Mean Arterial Pressure :   102 mmHg   Peripheral Pulse Rate :   68 bpm   BP Site :   Right arm   BP Method :   Electronic   Jennifer Pool CMA - 1/2/2022 11:46 AM CST   Health Status   Allergies Verified? :   Yes   Medication History Verified? :   Yes   Medical History Verified? :   Yes   Pre-Visit Planning Status :   Completed   Tobacco Use? :   Current every day smoker   Jennifer Pool CMA - 1/2/2022 11:46 AM CST   Consents   Consent for Immunization Exchange :   Consent Granted   Consent for Immunizations to Providers :   Consent Granted   Jennifer Pool CMA - 1/2/2022 11:46 AM CST   Meds / Allergies   (As Of: 1/2/2022 11:50:02 AM CST)   Allergies (Active)   No Known Medication Allergies  Estimated Onset Date:   Unspecified ; Created By:   Laura Yi MA; Reaction Status:   Active ; Category:   Drug ; Substance:   No Known Medication Allergies ; Type:   Allergy ; Updated By:   Laura Yi MA; Reviewed Date:   1/2/2022 11:48 AM CST        Medication List   (As Of: 1/2/2022 11:50:02 AM CST)   Prescription/Discharge Order    famotidine  :   famotidine ; Status:   Prescribed ; Ordered As Mnemonic:   famotidine 20 mg oral tablet ; Simple Display Line:   20 mg, 1 tab(s), Oral, daily, 90 tab(s), 1 Refill(s) ; Ordering Provider:   Gonzalo Ortiz MD; Catalog Code:   famotidine ; Order Dt/Tm:   9/16/2021 2:22:45 PM CDT

## 2022-03-02 NOTE — TELEPHONE ENCOUNTER
---------------------  From: Marielle Alcantar   Sent: 1/7/2022 3:14:31 PM CST  Subject: Curbside testing      Pt was seen at [facility] today for covid testing per [facility]. 02 Sat not taken today. Pt resides in [facility]-will notify Public Health if positive.

## 2022-03-02 NOTE — TELEPHONE ENCOUNTER
---------------------  From: Dannielle Damian LPN   To: Appointment Pool ([id number]_[place]);     Sent: 1/7/2022 8:47:53 AM CST  Subject: General Message     please contact patient to schedule for dalton [email or web address]

## 2022-03-02 NOTE — PROGRESS NOTES
Chief Complaint    verbal consent given for telemed, would like coivd testing, exposed to coivd on 1-3, asymptomatic  History of Present Illness       Patient is a 26-year-old female on telemedicine visit today       Patient at home and also with this consult       Physician clinic in [facility]       Call time 8:30 AM to 8:37 AM       Patient was exposed to COVID on Monday, January 3 while eating dinner with no mask.       Patient is completely asymptomatic       not vaccinated and does not want to talk about vaccines       expresses frustration over changing guidelines  Review of Systems      Negative except as listed in HPI.  Physical Exam   Vitals & Measurements    HT: 57 in       telemedicine visit      alert, oriented and in no acute distress  Assessment/Plan       Exposure to COVID-19 virus (Z20.822)          Discussed with the patient that best days for testing would be day 5-7 postexposure and today is day 4.         Patient verbalized understanding that her negative test may not be accurate if we are testing too early but patient would like to proceed with testing today.         Recommended she continue to wear a mask and to be retested if she develops any symptoms.         Patient verbalized understanding         Ordered:          SARS-CoV-2 RNA (COVID-19), Qualitative NAAT (Request), Exposure to COVID-19 virus           Patient Information     Name:TOYIN YOUNG      Address:      [address] [place] [id number]     Sex:Female     YOB: 1995     [phone]     Emergency Contact:[facility], CONTACT     MRN:[id number]     FIN:[id number]     Location:[facility]     Date of Service:01/07/2022      Primary Care Physician:       Gonzalo Ortiz MD, [phone]      Attending Physician:       Glenis Rowley MD, [phone]  Problem List/Past Medical History    Ongoing     GERD (gastroesophageal reflux disease)     Tobacco user    Historical      No qualifying data  Procedure/Surgical History     Wide excision of skin lesion (04/18/2012)      Comments: Right elbow tumor - myoepithelioma..     Excision (04/05/2012)      Comments: Right elbow mass.     Myringotomy and insertion of tympanic ventilation tube (1998)      Comments: Bilateral.     tumor excision      Comments: had benign tumor removed from left elbow---precancerous..  Medications    famotidine 20 mg oral tablet, 20 mg= 1 tab(s), Oral, daily, 1 refills  Allergies    No Known Medication Allergies  Social History    Smoking Status     Current every day smoker     Alcohol      Current, 3-5 times per week     Electronic Cigarette/Vaping      Electronic Cigarette Use: Never.     Employment/School      Work/School description: .     Exercise      Exercise frequency: No regular exercise..     Home/Environment      Marital status: Single. Lives with Family. Safe place to go: Yes.     Nutrition/Health      Type of diet: Regular.     Sexual      Sexually active: Yes. Sexual orientation: Bisexual.     Substance Abuse      Past     Tobacco      10 or more cigarettes (1/2 pack or more)/day in last 30 days  Family History    ADHD - Attention deficit disorder with hyperactivity: Sister.    Alcohol abuse: Father.    Asthma: Mother, Father and Sister.    Depression: Mother.    Diabetes mellitus: Grandmother (P).    Hypertension: Grandmother (P).    Substance abuse: Father.  Immunizations       Scheduled Immunizations       Dose Date(s)       DTaP       01/14/1997, 03/28/1997, 05/21/1997, 10/28/1998, 07/12/2001       Hep A, pediatric/adolescent       08/18/2008, 08/26/2009       hepatitis B pediatric vaccine       10/23/1997, 01/14/1997, 1996       Hib ([facility])       03/28/1997, 05/21/1997, 10/28/1998, 01/14/1997       human papillomavirus vaccine       12/13/2012       influenza virus vaccine, inactivated       02/21/2007, 09/30/2019       IPV       07/21/2001, Dose Not Given, Dose Not Given, Dose  Not Given       meningococcal conjugate vaccine       08/26/2009       MMR (measles/mumps/rubella)       07/12/2007, 11/18/1997, 08/19/2019       pneumococcal (PCV7)       Dose Not Given, Dose Not Given, Dose Not Given, Dose Not Given       rabies vaccine, human diploid cell       08/09/2019       Td       08/26/2009       tetanus/diphth/pertuss (Tdap) adult/adol       06/17/2019       varicella       08/15/2007, 08/24/2000, 08/18/2008       Other Immunizations               OPV       01/14/1997, 03/28/1997, 05/21/1997

## 2022-03-02 NOTE — TELEPHONE ENCOUNTER
---------------------  From: Isaias/Maryellen DAVIDSON (Phone Messages Pool ([id number]_[facility]))   Sent: 1/8/2022 1:03:54 PM CST  Subject: negative covid result      Patient notified of NEGATIVE covid result from 1/7/22. No further questions or concerns

## 2022-03-02 NOTE — PROGRESS NOTES
Chief Complaint    Right hand/pinky pain due to a fall. Swelling and bruising.  History of Present Illness      R little finger pain, swelling after trauma, falling 2 days ago.  Has noted pain, swelling, bruising but difficulty with using grasp at work.       not using any ibuprofen, tylenol, ice.       denies any tingling or numbness.       poor ROM   Review of Systems      Review of systems is negative with the exception of those noted in HPI   Physical Exam   Vitals & Measurements    HR: 68 (Peripheral)  BP: 134/86     HT: 57 in  WT: 91.5 lb  BMI: 19.8       nad appears well      exam of the R little finger reveals mild swelling at the PIP joint.       there is mild volar ecchymosis.       sensaiton and peripheral pulses intact, cap refill brisk,.       TTP middle phlanx and pip joint.       There is full AROM but painful.  0-90 limited by pain and stiffness      x-ray negative for acute process per my independent evaluation.          Assessment/Plan       1. Sprain of right little finger (S63.616A)         ice, elevation, NSAIDS for pain and swelling,  volar prefabricated finger splint in position of comfort provided.  FU prn.         encouraged ROM as tolerated by pain.  protect at work.        note for work today given.        Orders:         Review Orders for Potential Authorizations, 01/02/22 11:53:19 CST         XR Fingers Min 2 Views Right (Request), Instructions: 3 view, Right hand pain  Patient Information     Name:OTYIN YOUNG      Address:      [address] [place] [id number]     Sex:Female     YOB: 1995     [phone]     Emergency Contact:[facility], CONTACT     MRN:[id number]     FIN:[id number]     Location:[facility]     Date of Service:01/02/2022      Primary Care Physician:       Angel CACERES, Gonzalo, [phone]      Attending Physician:       Morenita ANDERS, Emma MIDDLETON, [phone]  Problem List/Past Medical History    Ongoing     GERD  (gastroesophageal reflux disease)     Tobacco user    Historical     No qualifying data  Procedure/Surgical History     Wide excision of skin lesion (04/18/2012)      Comments: Right elbow tumor - myoepithelioma..     Excision (04/05/2012)      Comments: Right elbow mass.     Myringotomy and insertion of tympanic ventilation tube (1998)      Comments: Bilateral.     tumor excision      Comments: had benign tumor removed from left elbow---precancerous..  Medications    famotidine 20 mg oral tablet, 20 mg= 1 tab(s), Oral, daily, 1 refills  Allergies    No Known Medication Allergies  Social History    Smoking Status     Current every day smoker     Alcohol      Current, 3-5 times per week     Electronic Cigarette/Vaping      Electronic Cigarette Use: Never.     Employment/School      Work/School description: .     Exercise      Exercise frequency: No regular exercise..     Home/Environment      Marital status: Single. Lives with Family. Safe place to go: Yes.     Nutrition/Health      Type of diet: Regular.     Sexual      Sexually active: Yes. Sexual orientation: Bisexual.     Substance Abuse      Past     Tobacco      10 or more cigarettes (1/2 pack or more)/day in last 30 days  Family History    ADHD - Attention deficit disorder with hyperactivity: Sister.    Alcohol abuse: Father.    Asthma: Mother, Father and Sister.    Depression: Mother.    Diabetes mellitus: Grandmother (P).    Hypertension: Grandmother (P).    Substance abuse: Father.  Immunizations       Scheduled Immunizations       Dose Date(s)       DTaP       01/14/1997, 03/28/1997, 05/21/1997, 10/28/1998, 07/12/2001       Hep A, pediatric/adolescent       08/18/2008, 08/26/2009       hepatitis B pediatric vaccine       10/23/1997, 01/14/1997, 1996       Hib ([facility])       03/28/1997, 05/21/1997, 10/28/1998, 01/14/1997       human papillomavirus vaccine       12/13/2012       influenza virus vaccine, inactivated       02/21/2007,  09/30/2019       IPV       07/21/2001, Dose Not Given, Dose Not Given, Dose Not Given       meningococcal conjugate vaccine       08/26/2009       MMR (measles/mumps/rubella)       07/12/2007, 11/18/1997, 08/19/2019       pneumococcal (PCV7)       Dose Not Given, Dose Not Given, Dose Not Given, Dose Not Given       rabies vaccine, human diploid cell       08/09/2019       Td       08/26/2009       tetanus/diphth/pertuss (Tdap) adult/adol       06/17/2019       varicella       08/15/2007, 08/24/2000, 08/18/2008       Other Immunizations               OPV       01/14/1997, 03/28/1997, 05/21/1997

## 2022-03-02 NOTE — NURSING NOTE
Comprehensive Intake Entered On:  1/7/2022 8:25 AM CST    Performed On:  1/7/2022 8:22 AM CST by Dannielle Damian LPN               Summary   Chief Complaint :   verbal consent given for telemed, would like coivd testing, exposed to coivd on 1-3, asymptomatic   Menstrual Status :   Menarcheal   Height Measured :   57 in(Converted to: 4 ft 9 in, 144.78 cm)    Dannielle Damian LPN - 1/7/2022 8:22 AM CST   Health Status   Allergies Verified? :   Yes   Medication History Verified? :   Yes   Pre-Visit Planning Status :   Completed   Tobacco Use? :   Current every day smoker   Tobacco Cessation Review :   Not ready to quit, Advised to quit   Dannielle Damian LPN - 1/7/2022 8:22 AM CST   Consents   Consent for Immunization Exchange :   Consent Granted   Consent for Immunizations to Providers :   Consent Granted   Dannielle Damian LPN - 1/7/2022 8:22 AM CST   Meds / Allergies   (As Of: 1/7/2022 8:25:21 AM CST)   Allergies (Active)   No Known Medication Allergies  Estimated Onset Date:   Unspecified ; Created By:   Laura Yi MA; Reaction Status:   Active ; Category:   Drug ; Substance:   No Known Medication Allergies ; Type:   Allergy ; Updated By:   Laura Yi MA; Reviewed Date:   1/7/2022 8:23 AM CST        Medication List   (As Of: 1/7/2022 8:25:21 AM CST)   Prescription/Discharge Order    famotidine  :   famotidine ; Status:   Prescribed ; Ordered As Mnemonic:   famotidine 20 mg oral tablet ; Simple Display Line:   20 mg, 1 tab(s), Oral, daily, 90 tab(s), 1 Refill(s) ; Ordering Provider:   Gonzalo Ortiz MD; Catalog Code:   famotidine ; Order Dt/Tm:   9/16/2021 2:22:45 PM CDT

## 2022-03-02 NOTE — TELEPHONE ENCOUNTER
---------------------  From: Glenis Rowley MD   To: Appointment Pool ([id number]_[place]);     Sent: 1/7/2022 8:37:45 AM CST  Subject: covid testing     Please schedule patient for curbside testing today.  Thank you!  NATHALEI Rios

## 2022-03-04 ENCOUNTER — TELEPHONE (OUTPATIENT)
Dept: FAMILY MEDICINE | Facility: CLINIC | Age: 27
End: 2022-03-04
Payer: MEDICAID

## 2022-03-04 NOTE — TELEPHONE ENCOUNTER
Tc with pt, advised to go to the ED for evaluation and treatment. Pt verbalized understanding and will go to ED as directed.

## 2022-03-04 NOTE — TELEPHONE ENCOUNTER
Pt reports 7 inch worm pulled from anus today, noticed worm after wiping and it was dangling there.  After worm removed, episode of black diarrhea.    Denies fevers  Has not traveled.  Has noted weight loss.     Message sent to scheduling for appointment to evaluate.    SARINA Rollins  [facility]

## 2022-06-21 ENCOUNTER — TELEPHONE (OUTPATIENT)
Dept: FAMILY MEDICINE | Facility: CLINIC | Age: 27
End: 2022-06-21

## 2023-05-09 ENCOUNTER — OFFICE VISIT (OUTPATIENT)
Dept: FAMILY MEDICINE | Facility: CLINIC | Age: 28
End: 2023-05-09
Payer: MEDICAID

## 2023-05-09 ENCOUNTER — TELEPHONE (OUTPATIENT)
Dept: FAMILY MEDICINE | Facility: CLINIC | Age: 28
End: 2023-05-09

## 2023-05-09 VITALS
WEIGHT: 92.4 LBS | HEIGHT: 57 IN | BODY MASS INDEX: 19.93 KG/M2 | RESPIRATION RATE: 16 BRPM | SYSTOLIC BLOOD PRESSURE: 130 MMHG | TEMPERATURE: 98.2 F | OXYGEN SATURATION: 99 % | HEART RATE: 66 BPM | DIASTOLIC BLOOD PRESSURE: 80 MMHG

## 2023-05-09 DIAGNOSIS — F91.3 OPPOSITIONAL DEFIANT DISORDER: ICD-10-CM

## 2023-05-09 DIAGNOSIS — Z71.6 TOBACCO ABUSE COUNSELING: ICD-10-CM

## 2023-05-09 DIAGNOSIS — Z32.01 PREGNANCY TEST POSITIVE: Primary | ICD-10-CM

## 2023-05-09 LAB — HCG UR QL: POSITIVE

## 2023-05-09 PROCEDURE — 99214 OFFICE O/P EST MOD 30 MIN: CPT | Performed by: FAMILY MEDICINE

## 2023-05-09 PROCEDURE — 81025 URINE PREGNANCY TEST: CPT | Performed by: FAMILY MEDICINE

## 2023-05-09 RX ORDER — PRENATAL WITH FERROUS FUM AND FOLIC ACID 3080; 920; 120; 400; 22; 1.84; 3; 20; 10; 1; 12; 200; 27; 25; 2 [IU]/1; [IU]/1; MG/1; [IU]/1; MG/1; MG/1; MG/1; MG/1; MG/1; MG/1; UG/1; MG/1; MG/1; MG/1; MG/1
1 TABLET ORAL DAILY
Qty: 90 TABLET | Refills: 3 | Status: SHIPPED | OUTPATIENT
Start: 2023-05-09

## 2023-05-09 RX ORDER — PRENATAL VIT/IRON FUM/FOLIC AC 27MG-0.8MG
1 TABLET ORAL DAILY
Qty: 90 TABLET | Refills: 3 | Status: SHIPPED | OUTPATIENT
Start: 2023-05-09 | End: 2023-05-09

## 2023-05-09 NOTE — PROGRESS NOTES
Assessment & Plan   Problem List Items Addressed This Visit        Behavioral    Oppositional defiant disorder   Other Visit Diagnoses     Pregnancy test positive    -  Primary    Relevant Medications    Prenatal 27-1 MG TABS    Other Relevant Orders    HCG qualitative urine (Completed)    Ob/Gyn Referral      Patient is here today with her 3-year-old son.  She reports that she has had a positive pregnancy test at home.  She and her partner have been trying.  LMP 3/12/2023 gives edc of 12/17/2023  8 weeks 2 days pregnant.  We will send prescription for prenatal vitamin, given patient first trimester pain bleeding and fever precautions.  She would like her referral to OB/GYN to be sent to [facility].  Referral placed.  She is welcome return to clinic as needed.    Oppositional defiant disorder noted and taken into account for medical decision making she reports that she and her partner are doing well.  She does not have any concerns about depression or anxiety at this time and does not feel like she needs any other resources.    Tobacco abuse tobacco cessation encouraged today.  She is planning to quit on her own.  She will reach out to us if she feels like she would like some additional help with becoming a quicker.  Did offer referral to [facility] pharmacist but declines for now             Nicotine/Tobacco Cessation:  She reports that she has been smoking cigarettes. She has a 0.30 pack-year smoking history. She has never used smokeless tobacco.  Nicotine/Tobacco Cessation Plan:   pt declines help at this time          Miesha Ramirez MD  [facility]    Didi Estrada is a 27 year old, presenting for the following health issues:  Pregnancy Test (She plans to quit smoking. )         View : No data to display.              History of Present Illness       Reason for visit:  Pregnancy confirmation    She eats 0-1 servings of fruits and vegetables daily.She consumes 2  "sweetened beverage(s) daily.She exercises with enough effort to increase her heart rate 30 to 60 minutes per day.  She exercises with enough effort to increase her heart rate 3 or less days per week. She is missing 7 dose(s) of medications per week.               Review of Systems         Objective    /80 (BP Location: Right arm, Patient Position: Sitting)   Pulse 66   Temp 98.2  F (36.8  C) (Tympanic)   Resp 16   Ht 1.448 m (4' 9\")   Wt 41.9 kg (92 lb 6.4 oz)   LMP 03/17/2023   SpO2 99%   BMI 20.00 kg/m    Body mass index is 20 kg/m .  Physical Exam                       "

## 2023-05-09 NOTE — TELEPHONE ENCOUNTER
[facility] calling to ask Dr. Ramirez send new Rx for 1mg for folic as opposed to the 0.8mg that was sent.    Insurance will not cover as the 0.8 can be OTC.

## 2023-05-09 NOTE — TELEPHONE ENCOUNTER
Reason for Call:  Appointment Request    Patient requesting this type of appt:  Pregnancy     Requested provider: any    Reason patient unable to be scheduled: Needs to be scheduled by clinic    When does patient want to be seen/preferred time: Same day    Comments: please call to schedule new prenatal visit.    Okay to leave a detailed message?: Yes at Home number on file [phone] (home)    Call taken on 5/9/2023 at 7:29 AM by Dee Braswell

## 2023-10-22 ENCOUNTER — HEALTH MAINTENANCE LETTER (OUTPATIENT)
Age: 28
End: 2023-10-22

## 2024-12-14 ENCOUNTER — HEALTH MAINTENANCE LETTER (OUTPATIENT)
Age: 29
End: 2024-12-14

## 2025-04-30 ENCOUNTER — HOSPITAL ENCOUNTER (EMERGENCY)
Facility: CLINIC | Age: 30
Discharge: HOME | End: 2025-04-30
Attending: EMERGENCY MEDICINE | Admitting: EMERGENCY MEDICINE
Payer: MEDICAID

## 2025-04-30 VITALS
BODY MASS INDEX: 17.96 KG/M2 | DIASTOLIC BLOOD PRESSURE: 80 MMHG | WEIGHT: 83 LBS | HEART RATE: 108 BPM | SYSTOLIC BLOOD PRESSURE: 138 MMHG | TEMPERATURE: 98.4 F | OXYGEN SATURATION: 100 % | RESPIRATION RATE: 19 BRPM

## 2025-04-30 DIAGNOSIS — R63.4 UNINTENTIONAL WEIGHT LOSS: ICD-10-CM

## 2025-04-30 DIAGNOSIS — R11.0 NAUSEA: ICD-10-CM

## 2025-04-30 DIAGNOSIS — K55.1 SMAS (SUPERIOR MESENTERIC ARTERY SYNDROME): ICD-10-CM

## 2025-04-30 LAB
ALBUMIN SERPL BCG-MCNC: 4.8 G/DL (ref 3.5–5.2)
ALP SERPL-CCNC: 67 U/L (ref 40–150)
ALT SERPL W P-5'-P-CCNC: 8 U/L (ref 0–50)
ANION GAP SERPL CALCULATED.3IONS-SCNC: 11 MMOL/L (ref 7–15)
AST SERPL W P-5'-P-CCNC: 17 U/L (ref 0–45)
BASOPHILS # BLD AUTO: 0.1 10E3/UL (ref 0–0.2)
BASOPHILS NFR BLD AUTO: 1 %
BILIRUB DIRECT SERPL-MCNC: 0.3 MG/DL (ref 0–0.3)
BILIRUB SERPL-MCNC: 0.9 MG/DL
BUN SERPL-MCNC: 9 MG/DL (ref 6–20)
CALCIUM SERPL-MCNC: 9.9 MG/DL (ref 8.8–10.4)
CHLORIDE SERPL-SCNC: 102 MMOL/L (ref 98–107)
CREAT SERPL-MCNC: 0.67 MG/DL (ref 0.51–0.95)
EGFRCR SERPLBLD CKD-EPI 2021: >90 ML/MIN/1.73M2
EOSINOPHIL # BLD AUTO: 0.3 10E3/UL (ref 0–0.7)
EOSINOPHIL NFR BLD AUTO: 4 %
ERYTHROCYTE [DISTWIDTH] IN BLOOD BY AUTOMATED COUNT: 11.9 % (ref 10–15)
GLUCOSE SERPL-MCNC: 99 MG/DL (ref 70–99)
HCO3 SERPL-SCNC: 25 MMOL/L (ref 22–29)
HCT VFR BLD AUTO: 37.4 % (ref 35–47)
HGB BLD-MCNC: 12.5 G/DL (ref 11.7–15.7)
IMM GRANULOCYTES # BLD: 0 10E3/UL
IMM GRANULOCYTES NFR BLD: 1 %
LIPASE SERPL-CCNC: 27 U/L (ref 13–60)
LYMPHOCYTES # BLD AUTO: 2.4 10E3/UL (ref 0.8–5.3)
LYMPHOCYTES NFR BLD AUTO: 38 %
MAGNESIUM SERPL-MCNC: 2.3 MG/DL (ref 1.7–2.3)
MCH RBC QN AUTO: 30.9 PG (ref 26.5–33)
MCHC RBC AUTO-ENTMCNC: 33.4 G/DL (ref 31.5–36.5)
MCV RBC AUTO: 93 FL (ref 78–100)
MONOCYTES # BLD AUTO: 0.4 10E3/UL (ref 0–1.3)
MONOCYTES NFR BLD AUTO: 6 %
NEUTROPHILS # BLD AUTO: 3.3 10E3/UL (ref 1.6–8.3)
NEUTROPHILS NFR BLD AUTO: 50 %
NRBC # BLD AUTO: 0 10E3/UL
NRBC BLD AUTO-RTO: 0 /100
PHOSPHATE SERPL-MCNC: 2.7 MG/DL (ref 2.5–4.5)
PLATELET # BLD AUTO: 297 10E3/UL (ref 150–450)
POTASSIUM SERPL-SCNC: 3.6 MMOL/L (ref 3.4–5.3)
PROT SERPL-MCNC: 7.7 G/DL (ref 6.4–8.3)
RBC # BLD AUTO: 4.04 10E6/UL (ref 3.8–5.2)
SODIUM SERPL-SCNC: 138 MMOL/L (ref 135–145)
WBC # BLD AUTO: 6.5 10E3/UL (ref 4–11)

## 2025-04-30 PROCEDURE — 80048 BASIC METABOLIC PNL TOTAL CA: CPT | Performed by: EMERGENCY MEDICINE

## 2025-04-30 PROCEDURE — 83690 ASSAY OF LIPASE: CPT | Performed by: EMERGENCY MEDICINE

## 2025-04-30 PROCEDURE — 85004 AUTOMATED DIFF WBC COUNT: CPT | Performed by: EMERGENCY MEDICINE

## 2025-04-30 PROCEDURE — 84100 ASSAY OF PHOSPHORUS: CPT | Performed by: EMERGENCY MEDICINE

## 2025-04-30 PROCEDURE — 83735 ASSAY OF MAGNESIUM: CPT | Performed by: EMERGENCY MEDICINE

## 2025-04-30 PROCEDURE — 99283 EMERGENCY DEPT VISIT LOW MDM: CPT | Performed by: EMERGENCY MEDICINE

## 2025-04-30 PROCEDURE — 82248 BILIRUBIN DIRECT: CPT | Performed by: EMERGENCY MEDICINE

## 2025-04-30 PROCEDURE — 36415 COLL VENOUS BLD VENIPUNCTURE: CPT | Performed by: EMERGENCY MEDICINE

## 2025-04-30 ASSESSMENT — COLUMBIA-SUICIDE SEVERITY RATING SCALE - C-SSRS
1. IN THE PAST MONTH, HAVE YOU WISHED YOU WERE DEAD OR WISHED YOU COULD GO TO SLEEP AND NOT WAKE UP?: NO
2. HAVE YOU ACTUALLY HAD ANY THOUGHTS OF KILLING YOURSELF IN THE PAST MONTH?: NO
6. HAVE YOU EVER DONE ANYTHING, STARTED TO DO ANYTHING, OR PREPARED TO DO ANYTHING TO END YOUR LIFE?: NO

## 2025-04-30 NOTE — ED TRIAGE NOTES
Pt arrives with continued fatigue. Was dx'd with SMAS recently and thought she could manage at home with trying to gain weight but she has lost more weight and has become more fatigued. She has chronic abdominal and back pain that hasn't changed. Worried about taking care of her kids due to the fatigue.     Triage Assessment (Adult)       Row Name 04/30/25 8606          Triage Assessment    Airway WDL WDL        Respiratory WDL    Respiratory WDL WDL        Skin Circulation/Temperature WDL    Skin Circulation/Temperature WDL WDL        Cardiac WDL    Cardiac WDL WDL        Peripheral/Neurovascular WDL    Peripheral Neurovascular WDL WDL        Cognitive/Neuro/Behavioral WDL    Cognitive/Neuro/Behavioral WDL WDL
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
## 2025-05-01 NOTE — ED NOTES
"Pt discharging from the lobby. Declined to have VS taken prior to discharge. Pt not interested with discharge teaching and told this writer, \"It's my business and I'll be fine.\"    AVS handed off to pt.   "

## 2025-05-01 NOTE — DISCHARGE INSTRUCTIONS
We had been planning to admit you to the hospital to pursue feeding tube placement as was recommended by your gastroenterologist.  Since you have to leave prior to completing this please continue to follow-up with GI and return to the hospital if you have worsening symptoms or reconsider further treatment.

## 2025-08-10 ENCOUNTER — HEALTH MAINTENANCE LETTER (OUTPATIENT)
Age: 30
End: 2025-08-10